# Patient Record
(demographics unavailable — no encounter records)

---

## 2017-07-21 NOTE — RADIOLOGY REPORT (SQ)
EXAM DESCRIPTION:  U/S RETROPERITON (RENAL/AORTA)



COMPLETED DATE/TIME:  7/21/2017 1:29 pm



REASON FOR STUDY:  CKD I (N18.1) N18.1  CHRONIC KIDNEY DISEASE, STAGE 1



COMPARISON:  Abdominal CT scan and renal ultrasound dated June 2014



TECHNIQUE:  Dynamic and static grayscale images acquired of the kidneys and bladder and recorded on P
ACS. Additional selected color Doppler and spectral images recorded.



LIMITATIONS:  None.



FINDINGS:  RIGHT KIDNEY:  11.4 cm in length.   Normal echogenicity.   No solid or suspicious masses. 
  No hydronephrosis.   No calcifications.

LEFT KIDNEY:  11.0 cm in length.   Normal echogenicity.   No solid or suspicious masses.   No hydrone
phrosis.   No calcifications.

BLADDER: A a heterogeneous mass is identified in the inferior bladder which is similar in appearance 
to the previous studies.  On the current study the mass measures 4.8 x 5.4 x 4.0 cm in diameters.  On
 the previous CT scan this was demonstrated to be a projection of the prostate gland

OTHER FINDINGS: No other significant finding.



IMPRESSION:  No significant renal abnormalities were identified.  Prostatic impression on the bladder
 base as noted above



TECHNICAL DOCUMENTATION:  JOB ID:  [id number]

 2011 [employer]- All Rights Reserved